# Patient Record
Sex: MALE | Race: WHITE
[De-identification: names, ages, dates, MRNs, and addresses within clinical notes are randomized per-mention and may not be internally consistent; named-entity substitution may affect disease eponyms.]

---

## 2020-08-01 ENCOUNTER — HOSPITAL ENCOUNTER (EMERGENCY)
Dept: HOSPITAL 7 - FB.ED | Age: 60
Discharge: HOME | End: 2020-08-01
Payer: COMMERCIAL
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
## 2020-08-01 DIAGNOSIS — Z79.82: ICD-10-CM

## 2020-08-01 DIAGNOSIS — S63.230A: Primary | ICD-10-CM
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
